# Patient Record
Sex: MALE | Race: BLACK OR AFRICAN AMERICAN | NOT HISPANIC OR LATINO | URBAN - METROPOLITAN AREA
[De-identification: names, ages, dates, MRNs, and addresses within clinical notes are randomized per-mention and may not be internally consistent; named-entity substitution may affect disease eponyms.]

---

## 2024-09-23 ENCOUNTER — OFFICE VISIT (OUTPATIENT)
Age: 54
End: 2024-09-23
Payer: COMMERCIAL

## 2024-09-23 VITALS
WEIGHT: 213.8 LBS | DIASTOLIC BLOOD PRESSURE: 84 MMHG | BODY MASS INDEX: 30.61 KG/M2 | OXYGEN SATURATION: 99 % | HEART RATE: 66 BPM | HEIGHT: 70 IN | SYSTOLIC BLOOD PRESSURE: 122 MMHG

## 2024-09-23 DIAGNOSIS — Z11.4 SCREENING FOR HIV (HUMAN IMMUNODEFICIENCY VIRUS): ICD-10-CM

## 2024-09-23 DIAGNOSIS — J30.2 SEASONAL ALLERGIC RHINITIS, UNSPECIFIED TRIGGER: ICD-10-CM

## 2024-09-23 DIAGNOSIS — K51.40 PSEUDOPOLYPOSIS OF COLON, UNSPECIFIED COMPLICATION STATUS, UNSPECIFIED PART OF COLON (HCC): ICD-10-CM

## 2024-09-23 DIAGNOSIS — Z11.59 NEED FOR HEPATITIS C SCREENING TEST: ICD-10-CM

## 2024-09-23 DIAGNOSIS — Z00.00 WELL ADULT EXAM: Primary | ICD-10-CM

## 2024-09-23 DIAGNOSIS — Z12.5 PROSTATE CANCER SCREENING: ICD-10-CM

## 2024-09-23 DIAGNOSIS — E78.1 HYPERTRIGLYCERIDEMIA: ICD-10-CM

## 2024-09-23 DIAGNOSIS — K21.9 GASTROESOPHAGEAL REFLUX DISEASE WITHOUT ESOPHAGITIS: ICD-10-CM

## 2024-09-23 DIAGNOSIS — I10 ESSENTIAL HYPERTENSION: ICD-10-CM

## 2024-09-23 DIAGNOSIS — Z13.6 ENCOUNTER FOR LIPID SCREENING FOR CARDIOVASCULAR DISEASE: ICD-10-CM

## 2024-09-23 DIAGNOSIS — Z13.220 ENCOUNTER FOR LIPID SCREENING FOR CARDIOVASCULAR DISEASE: ICD-10-CM

## 2024-09-23 PROBLEM — Z82.3 FAMILY HISTORY OF STROKE: Status: ACTIVE | Noted: 2021-03-03

## 2024-09-23 PROBLEM — K63.5 POLYP OF COLON: Status: ACTIVE | Noted: 2021-03-03

## 2024-09-23 PROCEDURE — 99214 OFFICE O/P EST MOD 30 MIN: CPT | Performed by: FAMILY MEDICINE

## 2024-09-23 PROCEDURE — 99396 PREV VISIT EST AGE 40-64: CPT | Performed by: FAMILY MEDICINE

## 2024-09-23 RX ORDER — NAPROXEN 500 MG/1
500 TABLET ORAL
COMMUNITY

## 2024-09-23 RX ORDER — ASPIRIN 81 MG/1
81 TABLET ORAL DAILY
COMMUNITY

## 2024-09-23 RX ORDER — IPRATROPIUM BROMIDE 42 UG/1
2 SPRAY, METERED NASAL 4 TIMES DAILY
COMMUNITY

## 2024-09-23 RX ORDER — OMEPRAZOLE 40 MG/1
CAPSULE, DELAYED RELEASE ORAL
COMMUNITY

## 2024-09-23 RX ORDER — METOPROLOL TARTRATE 25 MG/1
25 TABLET, FILM COATED ORAL DAILY
COMMUNITY

## 2024-09-23 RX ORDER — FLUTICASONE PROPIONATE 50 MCG
SPRAY, SUSPENSION (ML) NASAL
COMMUNITY

## 2024-09-23 NOTE — PROGRESS NOTES
Adult Annual Physical  Name: Davion Lobato      : 1970      MRN: [id number]  Encounter Provider: AASHISH Cesar  Encounter Date: 2024   Encounter department: [facility] PRIMARY CARE    Assessment & Plan  Well adult exam    Orders:    Lipid panel; Future    Comprehensive metabolic panel; Future    CBC and differential; Future    TSH, 3rd generation; Future    T4, free; Future    Lipid panel    Comprehensive metabolic panel    CBC and differential    TSH, 3rd generation    T4, free    Encounter for lipid screening for cardiovascular disease    Orders:    Lipid panel; Future    Comprehensive metabolic panel; Future    CBC and differential; Future    TSH, 3rd generation; Future    T4, free; Future    Lipid panel    Comprehensive metabolic panel    CBC and differential    TSH, 3rd generation    T4, free    Gastroesophageal reflux disease without esophagitis  Controlled with Omeprazole         Essential hypertension  Controlled with diet and exercise.  DASH diet (low saturated fat, cholesterol, and total fat; increase fruits and vegetables; fat free or low fat milk or milk products; and increased fiber). Aerobic exercise and limitation of sodium. Weight loss.          Seasonal allergic rhinitis, unspecified trigger  Flonase as needed         Pseudopolyposis of colon, unspecified complication status, unspecified part of colon (HCC)  Last colonoscopy was . Recall 5 years         Hypertriglyceridemia  Will check labs. Patient educated on low carbohydrate diet         Prostate cancer screening    Orders:    PSA, total and free; Future    PSA, total and free    Need for hepatitis C screening test    Orders:    Hepatitis C Antibody; Future    Screening for HIV (human immunodeficiency virus)    Orders:    HIV 1/2 AG/AB w Reflex [facility] for 2 yr old and above; Future    Immunizations and preventive care screenings were discussed with patient today. Appropriate education was printed on patient's  "after visit summary.        Counseling:  Exercise: the importance of regular exercise/physical activity was discussed. Recommend exercise 3-5 times per week for at least 30 minutes.          History of Present Illness     Adult Annual Physical:  Patient presents for annual physical.     Diet and Physical Activity:  - Diet/Nutrition: well balanced diet.  - Exercise: moderate cardiovascular exercise and strength training exercises.    Depression Screening:  - PHQ-2 Score: 0    General Health:  - Sleep: sleeps well.  - Hearing: tinnitus.  - Vision: wears glasses.  - Dental: brushes teeth twice daily.    Advanced Care Planning:  - Has an advanced directive?: no    - Has a durable medical POA?: no    - ACP document given to patient?: yes      Review of Systems   Constitutional:  Negative for chills and fever.   HENT:  Positive for postnasal drip. Negative for ear pain and sore throat.    Eyes:  Negative for pain and visual disturbance.   Respiratory:  Negative for cough and shortness of breath.    Cardiovascular:  Negative for chest pain and palpitations.   Gastrointestinal:  Negative for abdominal pain and vomiting.   Genitourinary:  Negative for dysuria and hematuria.   Musculoskeletal:  Negative for arthralgias and back pain.   Skin:  Negative for color change and rash.   Neurological:  Negative for seizures and syncope.   All other systems reviewed and are negative.        Objective     /84 (BP Location: Left arm, Patient Position: Sitting, Cuff Size: Standard)   Pulse 66   Ht 5' 10\" (1.778 m)   Wt 97 kg (213 lb 12.8 oz)   SpO2 99%   BMI 30.68 kg/m²     Physical Exam  Vitals and nursing note reviewed.   Constitutional:       General: He is not in acute distress.     Appearance: He is well-developed.   HENT:      Head: Normocephalic and atraumatic.      Mouth/Throat:      Pharynx: Posterior oropharyngeal erythema present.   Eyes:      Conjunctiva/sclera: Conjunctivae normal.   Cardiovascular:      Rate and " Rhythm: Normal rate and regular rhythm.      Heart sounds: No murmur heard.  Pulmonary:      Effort: Pulmonary effort is normal. No respiratory distress.      Breath sounds: Normal breath sounds.   Abdominal:      Palpations: Abdomen is soft.      Tenderness: There is no abdominal tenderness.   Musculoskeletal:         General: No swelling.      Cervical back: Neck supple.   Skin:     General: Skin is warm and dry.      Capillary Refill: Capillary refill takes less than 2 seconds.   Neurological:      Mental Status: He is alert.   Psychiatric:         Mood and Affect: Mood normal.

## 2024-09-23 NOTE — ASSESSMENT & PLAN NOTE
Controlled with diet and exercise.  DASH diet (low saturated fat, cholesterol, and total fat; increase fruits and vegetables; fat free or low fat milk or milk products; and increased fiber). Aerobic exercise and limitation of sodium. Weight loss.

## 2025-02-27 ENCOUNTER — APPOINTMENT (OUTPATIENT)
Age: 55
End: 2025-02-27
Payer: COMMERCIAL

## 2025-02-27 DIAGNOSIS — Z11.4 SCREENING FOR HIV (HUMAN IMMUNODEFICIENCY VIRUS): ICD-10-CM

## 2025-02-27 DIAGNOSIS — Z00.00 WELL ADULT EXAM: Primary | ICD-10-CM

## 2025-02-27 DIAGNOSIS — Z11.59 NEED FOR HEPATITIS C SCREENING TEST: ICD-10-CM

## 2025-02-27 DIAGNOSIS — Z12.5 PROSTATE CANCER SCREENING: ICD-10-CM

## 2025-02-27 DIAGNOSIS — Z13.6 ENCOUNTER FOR LIPID SCREENING FOR CARDIOVASCULAR DISEASE: ICD-10-CM

## 2025-02-27 DIAGNOSIS — Z13.220 ENCOUNTER FOR LIPID SCREENING FOR CARDIOVASCULAR DISEASE: ICD-10-CM

## 2025-02-27 LAB
ALBUMIN SERPL BCG-MCNC: 3.9 G/DL (ref 3.5–5)
ALP SERPL-CCNC: 96 U/L (ref 34–104)
ALT SERPL W P-5'-P-CCNC: 17 U/L (ref 7–52)
ANION GAP SERPL CALCULATED.3IONS-SCNC: 8 MMOL/L (ref 4–13)
AST SERPL W P-5'-P-CCNC: 20 U/L (ref 13–39)
BASOPHILS # BLD AUTO: 0.04 THOUSANDS/ÂΜL (ref 0–0.1)
BASOPHILS NFR BLD AUTO: 1 % (ref 0–1)
BILIRUB SERPL-MCNC: 1 MG/DL (ref 0.2–1)
BUN SERPL-MCNC: 17 MG/DL (ref 5–25)
CALCIUM SERPL-MCNC: 8.9 MG/DL (ref 8.4–10.2)
CHLORIDE SERPL-SCNC: 105 MMOL/L (ref 96–108)
CHOLEST SERPL-MCNC: 117 MG/DL (ref ?–200)
CO2 SERPL-SCNC: 27 MMOL/L (ref 21–32)
CREAT SERPL-MCNC: 0.93 MG/DL (ref 0.6–1.3)
EOSINOPHIL # BLD AUTO: 0.07 THOUSAND/ÂΜL (ref 0–0.61)
EOSINOPHIL NFR BLD AUTO: 1 % (ref 0–6)
ERYTHROCYTE [DISTWIDTH] IN BLOOD BY AUTOMATED COUNT: 12.6 % (ref 11.6–15.1)
GFR SERPL CREATININE-BSD FRML MDRD: 91 ML/MIN/1.73SQ M
GLUCOSE P FAST SERPL-MCNC: 82 MG/DL (ref 65–99)
HCT VFR BLD AUTO: 53.4 % (ref 36.5–49.3)
HDLC SERPL-MCNC: 33 MG/DL
HGB BLD-MCNC: 17 G/DL (ref 12–17)
IMM GRANULOCYTES # BLD AUTO: 0.02 THOUSAND/UL (ref 0–0.2)
IMM GRANULOCYTES NFR BLD AUTO: 0 % (ref 0–2)
LDLC SERPL CALC-MCNC: 57 MG/DL (ref 0–100)
LYMPHOCYTES # BLD AUTO: 2.29 THOUSANDS/ÂΜL (ref 0.6–4.47)
LYMPHOCYTES NFR BLD AUTO: 30 % (ref 14–44)
MCH RBC QN AUTO: 25.8 PG (ref 26.8–34.3)
MCHC RBC AUTO-ENTMCNC: 31.8 G/DL (ref 31.4–37.4)
MCV RBC AUTO: 81 FL (ref 82–98)
MONOCYTES # BLD AUTO: 0.66 THOUSAND/ÂΜL (ref 0.17–1.22)
MONOCYTES NFR BLD AUTO: 9 % (ref 4–12)
NEUTROPHILS # BLD AUTO: 4.67 THOUSANDS/ÂΜL (ref 1.85–7.62)
NEUTS SEG NFR BLD AUTO: 59 % (ref 43–75)
NONHDLC SERPL-MCNC: 84 MG/DL
NRBC BLD AUTO-RTO: 0 /100 WBCS
PLATELET # BLD AUTO: 251 THOUSANDS/UL (ref 149–390)
PMV BLD AUTO: 11.6 FL (ref 8.9–12.7)
POTASSIUM SERPL-SCNC: 4 MMOL/L (ref 3.5–5.3)
PROT SERPL-MCNC: 7.1 G/DL (ref 6.4–8.4)
PSA FREE MFR SERPL: 34.8 %
PSA FREE SERPL-MCNC: 0.13 NG/ML
PSA SERPL-MCNC: 0.36 NG/ML (ref 0–4)
RBC # BLD AUTO: 6.6 MILLION/UL (ref 3.88–5.62)
SODIUM SERPL-SCNC: 140 MMOL/L (ref 135–147)
T4 FREE SERPL-MCNC: 0.9 NG/DL (ref 0.61–1.12)
TRIGL SERPL-MCNC: 135 MG/DL (ref ?–150)
TSH SERPL DL<=0.05 MIU/L-ACNC: 0.49 UIU/ML (ref 0.45–4.5)
WBC # BLD AUTO: 7.75 THOUSAND/UL (ref 4.31–10.16)

## 2025-02-27 PROCEDURE — 84439 ASSAY OF FREE THYROXINE: CPT

## 2025-02-27 PROCEDURE — 84443 ASSAY THYROID STIM HORMONE: CPT

## 2025-02-27 PROCEDURE — 84153 ASSAY OF PSA TOTAL: CPT

## 2025-02-27 PROCEDURE — 36415 COLL VENOUS BLD VENIPUNCTURE: CPT

## 2025-02-27 PROCEDURE — 85025 COMPLETE CBC W/AUTO DIFF WBC: CPT

## 2025-02-27 PROCEDURE — 80061 LIPID PANEL: CPT

## 2025-02-27 PROCEDURE — 80053 COMPREHEN METABOLIC PANEL: CPT

## 2025-02-27 PROCEDURE — 87389 HIV-1 AG W/HIV-1&-2 AB AG IA: CPT

## 2025-02-27 PROCEDURE — 84154 ASSAY OF PSA FREE: CPT

## 2025-02-27 PROCEDURE — 86803 HEPATITIS C AB TEST: CPT

## 2025-02-28 LAB
HCV AB SER QL: NORMAL
HIV 1+2 AB+HIV1 P24 AG SERPL QL IA: NORMAL

## 2025-03-24 ENCOUNTER — OFFICE VISIT (OUTPATIENT)
Age: 55
End: 2025-03-24
Payer: COMMERCIAL

## 2025-03-24 VITALS
HEART RATE: 90 BPM | BODY MASS INDEX: 31.81 KG/M2 | HEIGHT: 70 IN | SYSTOLIC BLOOD PRESSURE: 110 MMHG | OXYGEN SATURATION: 98 % | WEIGHT: 222.2 LBS | DIASTOLIC BLOOD PRESSURE: 88 MMHG

## 2025-03-24 DIAGNOSIS — R71.8 ELEVATED HEMATOCRIT: ICD-10-CM

## 2025-03-24 DIAGNOSIS — I10 ESSENTIAL HYPERTENSION: Primary | ICD-10-CM

## 2025-03-24 DIAGNOSIS — J30.2 SEASONAL ALLERGIC RHINITIS, UNSPECIFIED TRIGGER: ICD-10-CM

## 2025-03-24 DIAGNOSIS — E78.1 HYPERTRIGLYCERIDEMIA: ICD-10-CM

## 2025-03-24 DIAGNOSIS — K21.9 GASTROESOPHAGEAL REFLUX DISEASE WITHOUT ESOPHAGITIS: ICD-10-CM

## 2025-03-24 PROCEDURE — 99214 OFFICE O/P EST MOD 30 MIN: CPT | Performed by: FAMILY MEDICINE

## 2025-03-24 NOTE — PROGRESS NOTES
Name: Davion Lobato      : 1970      MRN: [id number]  Encounter Provider: AASHISH Cesar  Encounter Date: 3/24/2025   Encounter department: [facility] PRIMARY CARE  :  Assessment & Plan  Essential hypertension  Controlled with diet and exercise.  DASH diet (low saturated fat, cholesterol, and total fat; increase fruits and vegetables; fat free or low fat milk or milk products; and increased fiber). Aerobic exercise and limitation of sodium. Weight loss.             Seasonal allergic rhinitis, unspecified trigger  Flonase as needed               Gastroesophageal reflux disease without esophagitis  Controlled with Omeprazole          Hypertriglyceridemia   Patient educated on low carbohydrate diet               Elevated hematocrit    Orders:    Ambulatory Referral to Sleep Medicine; Future    CBC and differential; Future    Comprehensive metabolic panel; Future           History of Present Illness     Davion Lobato is here for chronic conditions f/u. Pt. had labs done prior to today's visit which included Recent Results (from the past 4 weeks)  -Hepatitis C Antibody:   Collection Time: 25  7:32 AM       Result                      Value             Ref Range           Hepatitis C Ab              Non-reactive      Non-Reactive   -HIV 1/2 AG/AB w Reflex [facility] for 2 yr old and above:   Collection Time: 25  7:32 AM       Result                      Value             Ref Range           HIV AB/AG HT Interp         Non-Reactive      Non-Reactive   -T4, free:   Collection Time: 25  7:32 AM       Result                      Value             Ref Range           Free T4                     0.90              0.61 - 1.12 *  -TSH, 3rd generation:   Collection Time: 25  7:32 AM       Result                      Value             Ref Range           TSH 3RD GENERATON           0.491             0.450 - 4.50*  -CBC and differential:   Collection Time: 25  7:32 AM       Result                       Value             Ref Range           WBC                         7.75              4.31 - 10.16*       RBC                         6.60 (H)          3.88 - 5.62 *       Hemoglobin                  17.0              12.0 - 17.0 *       Hematocrit                  53.4 (H)          36.5 - 49.3 %       MCV                         81 (L)            82 - 98 fL          MCH                         25.8 (L)          26.8 - 34.3 *       MCHC                        31.8              31.4 - 37.4 *       RDW                         12.6              11.6 - 15.1 %       MPV                         11.6              8.9 - 12.7 fL       Platelets                   251               149 - 390 Th*       nRBC                        0                 /100 WBCs           Segmented %                 59                43 - 75 %           Immature Grans %            0                 0 - 2 %             Lymphocytes %               30                14 - 44 %           Monocytes %                 9                 4 - 12 %            Eosinophils Relative        1                 0 - 6 %             Basophils Relative          1                 0 - 1 %             Absolute Neutrophils        4.67              1.85 - 7.62 *       Absolute Immature Grans     0.02              0.00 - 0.20 *       Absolute Lymphocytes        2.29              0.60 - 4.47 *       Absolute Monocytes          0.66              0.17 - 1.22 *       Eosinophils Absolute        0.07              0.00 - 0.61 *       Basophils Absolute          0.04              0.00 - 0.10 *  -Comprehensive metabolic panel:   Collection Time: 02/27/25  7:32 AM       Result                      Value             Ref Range           Sodium                      140               135 - 147 mm*       Potassium                   4.0               3.5 - 5.3 mm*       Chloride                    105               96 - 108 mmo*       CO2                         27                 21 - 32 mmol*       ANION GAP                   8                 4 - 13 mmol/L       BUN                         17                5 - 25 mg/dL        Creatinine                  0.93              0.60 - 1.30 *       Glucose, Fasting            82                65 - 99 mg/dL       Calcium                     8.9               8.4 - 10.2 m*       AST                         20                13 - 39 U/L         ALT                         17                7 - 52 U/L          Alkaline Phosphatase        96                34 - 104 U/L        Total Protein               7.1               6.4 - 8.4 g/*       Albumin                     3.9               3.5 - 5.0 g/*       Total Bilirubin             1.00              0.20 - 1.00 *       eGFR                        91                ml/min/1.73s*  -Lipid panel:   Collection Time: 02/27/25  7:32 AM       Result                      Value             Ref Range           Cholesterol                 117               See Comment *       Triglycerides               135               See Comment *       HDL, Direct                 33 (L)            >=40 mg/dL          LDL Calculated              57                0 - 100 mg/dL       Non-HDL-Chol (CHOL-HDL)     84                mg/dl          -PSA, total and free:   Collection Time: 02/27/25  7:32 AM       Result                      Value             Ref Range           PSA, Diagnostic             0.365             0.000 - 4.00*       PSA, Free                   0.127             ng/mL               PSA % Free                  34.795            %                    Review of Systems   Constitutional:  Negative for chills and fever.   HENT:  Negative for ear pain and sore throat.    Eyes:  Negative for pain and visual disturbance.   Respiratory:  Negative for cough and shortness of breath.    Cardiovascular:  Negative for chest pain and palpitations.   Gastrointestinal:  Negative for abdominal pain and vomiting.  "  Genitourinary:  Negative for dysuria and hematuria.   Musculoskeletal:  Negative for arthralgias and back pain.   Skin:  Negative for color change and rash.   Neurological:  Negative for seizures and syncope.   All other systems reviewed and are negative.      Objective   /88 (BP Location: Left arm, Patient Position: Sitting, Cuff Size: Standard)   Pulse 90   Ht 5' 10\" (1.778 m)   Wt 101 kg (222 lb 3.2 oz)   SpO2 98%   BMI 31.88 kg/m²      Physical Exam  Vitals and nursing note reviewed.   Constitutional:       General: He is not in acute distress.     Appearance: He is well-developed. He is obese.   HENT:      Head: Normocephalic and atraumatic.   Eyes:      Conjunctiva/sclera: Conjunctivae normal.   Cardiovascular:      Rate and Rhythm: Normal rate and regular rhythm.      Heart sounds: No murmur heard.  Pulmonary:      Effort: Pulmonary effort is normal. No respiratory distress.      Breath sounds: Normal breath sounds.   Abdominal:      Palpations: Abdomen is soft.      Tenderness: There is no abdominal tenderness.   Musculoskeletal:         General: No swelling.      Cervical back: Neck supple.   Skin:     General: Skin is warm and dry.      Capillary Refill: Capillary refill takes less than 2 seconds.   Neurological:      Mental Status: He is alert.   Psychiatric:         Mood and Affect: Mood normal.         "

## 2025-06-20 RX ORDER — PANTOPRAZOLE SODIUM 40 MG/1
40 TABLET, DELAYED RELEASE ORAL
COMMUNITY
Start: 2025-05-18

## 2025-06-23 ENCOUNTER — OFFICE VISIT (OUTPATIENT)
Age: 55
End: 2025-06-23
Payer: COMMERCIAL

## 2025-06-23 ENCOUNTER — APPOINTMENT (OUTPATIENT)
Age: 55
End: 2025-06-23
Payer: COMMERCIAL

## 2025-06-23 VITALS
BODY MASS INDEX: 30.69 KG/M2 | OXYGEN SATURATION: 98 % | HEART RATE: 72 BPM | DIASTOLIC BLOOD PRESSURE: 80 MMHG | WEIGHT: 214.4 LBS | HEIGHT: 70 IN | SYSTOLIC BLOOD PRESSURE: 124 MMHG

## 2025-06-23 DIAGNOSIS — K21.9 GASTROESOPHAGEAL REFLUX DISEASE WITHOUT ESOPHAGITIS: ICD-10-CM

## 2025-06-23 DIAGNOSIS — E78.1 HYPERTRIGLYCERIDEMIA: ICD-10-CM

## 2025-06-23 DIAGNOSIS — J30.2 SEASONAL ALLERGIC RHINITIS, UNSPECIFIED TRIGGER: ICD-10-CM

## 2025-06-23 DIAGNOSIS — R71.8 ELEVATED HEMATOCRIT: Primary | ICD-10-CM

## 2025-06-23 DIAGNOSIS — I10 ESSENTIAL HYPERTENSION: Primary | ICD-10-CM

## 2025-06-23 DIAGNOSIS — R10.13 EPIGASTRIC PAIN: ICD-10-CM

## 2025-06-23 PROBLEM — G47.30 SLEEP APNEA: Status: ACTIVE | Noted: 2025-06-23

## 2025-06-23 LAB
ALBUMIN SERPL BCG-MCNC: 4.2 G/DL (ref 3.5–5)
ALP SERPL-CCNC: 96 U/L (ref 34–104)
ALT SERPL W P-5'-P-CCNC: 19 U/L (ref 7–52)
ANION GAP SERPL CALCULATED.3IONS-SCNC: 9 MMOL/L (ref 4–13)
AST SERPL W P-5'-P-CCNC: 26 U/L (ref 13–39)
BASOPHILS # BLD AUTO: 0.04 THOUSANDS/ÂΜL (ref 0–0.1)
BASOPHILS NFR BLD AUTO: 1 % (ref 0–1)
BILIRUB SERPL-MCNC: 0.66 MG/DL (ref 0.2–1)
BUN SERPL-MCNC: 14 MG/DL (ref 5–25)
CALCIUM SERPL-MCNC: 8.9 MG/DL (ref 8.4–10.2)
CHLORIDE SERPL-SCNC: 104 MMOL/L (ref 96–108)
CO2 SERPL-SCNC: 27 MMOL/L (ref 21–32)
CREAT SERPL-MCNC: 1.03 MG/DL (ref 0.6–1.3)
EOSINOPHIL # BLD AUTO: 0.07 THOUSAND/ÂΜL (ref 0–0.61)
EOSINOPHIL NFR BLD AUTO: 1 % (ref 0–6)
ERYTHROCYTE [DISTWIDTH] IN BLOOD BY AUTOMATED COUNT: 13.2 % (ref 11.6–15.1)
GFR SERPL CREATININE-BSD FRML MDRD: 80 ML/MIN/1.73SQ M
GLUCOSE SERPL-MCNC: 90 MG/DL (ref 65–140)
HCT VFR BLD AUTO: 54.6 % (ref 36.5–49.3)
HGB BLD-MCNC: 17 G/DL (ref 12–17)
IMM GRANULOCYTES # BLD AUTO: 0.02 THOUSAND/UL (ref 0–0.2)
IMM GRANULOCYTES NFR BLD AUTO: 0 % (ref 0–2)
LYMPHOCYTES # BLD AUTO: 2.3 THOUSANDS/ÂΜL (ref 0.6–4.47)
LYMPHOCYTES NFR BLD AUTO: 27 % (ref 14–44)
MCH RBC QN AUTO: 26 PG (ref 26.8–34.3)
MCHC RBC AUTO-ENTMCNC: 31.1 G/DL (ref 31.4–37.4)
MCV RBC AUTO: 83 FL (ref 82–98)
MONOCYTES # BLD AUTO: 0.7 THOUSAND/ÂΜL (ref 0.17–1.22)
MONOCYTES NFR BLD AUTO: 8 % (ref 4–12)
NEUTROPHILS # BLD AUTO: 5.42 THOUSANDS/ÂΜL (ref 1.85–7.62)
NEUTS SEG NFR BLD AUTO: 63 % (ref 43–75)
NRBC BLD AUTO-RTO: 0 /100 WBCS
PLATELET # BLD AUTO: 205 THOUSANDS/UL (ref 149–390)
PMV BLD AUTO: 11.7 FL (ref 8.9–12.7)
POTASSIUM SERPL-SCNC: 4 MMOL/L (ref 3.5–5.3)
PROT SERPL-MCNC: 6.9 G/DL (ref 6.4–8.4)
RBC # BLD AUTO: 6.55 MILLION/UL (ref 3.88–5.62)
SODIUM SERPL-SCNC: 140 MMOL/L (ref 135–147)
WBC # BLD AUTO: 8.55 THOUSAND/UL (ref 4.31–10.16)

## 2025-06-23 PROCEDURE — 85025 COMPLETE CBC W/AUTO DIFF WBC: CPT

## 2025-06-23 PROCEDURE — 80053 COMPREHEN METABOLIC PANEL: CPT

## 2025-06-23 PROCEDURE — 99214 OFFICE O/P EST MOD 30 MIN: CPT | Performed by: FAMILY MEDICINE

## 2025-06-23 PROCEDURE — 36415 COLL VENOUS BLD VENIPUNCTURE: CPT

## 2025-06-23 NOTE — PROGRESS NOTES
Name: Davion Lobato      : 1970      MRN: [id number]  Encounter Provider: AASHISH Cesar  Encounter Date: 2025   Encounter department: [facility] PRIMARY CARE  :  Assessment & Plan  Essential hypertension  Controlled with diet and exercise.  DASH diet (low saturated fat, cholesterol, and total fat; increase fruits and vegetables; fat free or low fat milk or milk products; and increased fiber). Aerobic exercise and limitation of sodium. Weight loss.                   Seasonal allergic rhinitis, unspecified trigger  Flonase as needed                     Gastroesophageal reflux disease without esophagitis  Controlled with Omeprazole                Hypertriglyceridemia   Patient educated on low carbohydrate diet                     Primary obstructive sleep apnea of   Patient is using a CPAP         Epigastric pain  Improved with Pantoprazole. Patient will have an EGD in August              History of Present Illness   Patient here for a follow up. Patient was seen at [facility] ER for chest and abdominal pain. Patient had EKG and troponins which were WNL. Patient has a follow up with Cardiology in August and will be cleared before he can have an Endoscopy. Patient was given a diet and he has been trying to lose weight. Patient denies chest pain today however he still has epigastric pain. He was given pantoprazole which helped with the pain.  Patient also was diagnosed with Sleep apnea and started using a CPAP on Saturday. He has noticed slight improvement in his sleeping. Otherwise he is doing well.       Review of Systems   Constitutional:  Negative for chills and fever.   HENT:  Negative for ear pain and sore throat.    Eyes:  Negative for pain and visual disturbance.   Respiratory:  Positive for apnea. Negative for cough and shortness of breath.    Cardiovascular:  Negative for chest pain and palpitations.   Gastrointestinal:  Positive for abdominal pain (Epigastric, sharp.  "Better with Pantoprazole). Negative for vomiting.   Genitourinary:  Negative for dysuria and hematuria.   Musculoskeletal:  Negative for arthralgias and back pain.   Skin:  Negative for color change and rash.   Neurological:  Negative for seizures and syncope.   All other systems reviewed and are negative.      Objective   /80 (BP Location: Left arm, Patient Position: Sitting, Cuff Size: Standard)   Pulse 72   Ht 5' 10\" (1.778 m)   Wt 97.3 kg (214 lb 6.4 oz)   SpO2 98%   BMI 30.76 kg/m²      Physical Exam  Vitals and nursing note reviewed.   Constitutional:       General: He is not in acute distress.     Appearance: He is well-developed. He is obese.   HENT:      Head: Normocephalic and atraumatic.     Eyes:      Conjunctiva/sclera: Conjunctivae normal.       Cardiovascular:      Rate and Rhythm: Normal rate and regular rhythm.      Heart sounds: No murmur heard.  Pulmonary:      Effort: Pulmonary effort is normal. No respiratory distress.      Breath sounds: Normal breath sounds.   Abdominal:      Palpations: Abdomen is soft.      Tenderness: There is no abdominal tenderness.     Musculoskeletal:         General: No swelling.      Cervical back: Neck supple.     Skin:     General: Skin is warm and dry.      Capillary Refill: Capillary refill takes less than 2 seconds.     Neurological:      Mental Status: He is alert.     Psychiatric:         Mood and Affect: Mood normal.         "